# Patient Record
Sex: MALE
[De-identification: names, ages, dates, MRNs, and addresses within clinical notes are randomized per-mention and may not be internally consistent; named-entity substitution may affect disease eponyms.]

---

## 2017-04-20 PROBLEM — Z00.00 ENCOUNTER FOR PREVENTIVE HEALTH EXAMINATION: Status: ACTIVE | Noted: 2017-04-20

## 2017-04-28 ENCOUNTER — APPOINTMENT (OUTPATIENT)
Dept: GASTROENTEROLOGY | Facility: CLINIC | Age: 62
End: 2017-04-28

## 2020-04-13 ENCOUNTER — EMERGENCY (EMERGENCY)
Facility: HOSPITAL | Age: 65
LOS: 1 days | Discharge: HOME | End: 2020-04-13
Attending: EMERGENCY MEDICINE
Payer: COMMERCIAL

## 2020-04-13 VITALS
WEIGHT: 149.91 LBS | HEART RATE: 102 BPM | SYSTOLIC BLOOD PRESSURE: 127 MMHG | TEMPERATURE: 99 F | RESPIRATION RATE: 16 BRPM | HEIGHT: 65 IN | DIASTOLIC BLOOD PRESSURE: 83 MMHG | OXYGEN SATURATION: 97 %

## 2020-04-13 PROCEDURE — 93010 ELECTROCARDIOGRAM REPORT: CPT

## 2020-04-13 PROCEDURE — 99284 EMERGENCY DEPT VISIT MOD MDM: CPT

## 2020-04-13 NOTE — ED ADULT TRIAGE NOTE - CHIEF COMPLAINT QUOTE
+covid increased diff breathing ,resolved with supplemental oxygen.  As per EMS 95% on room air, 97% on 4L NC.

## 2020-04-13 NOTE — ED PROVIDER NOTE - PHYSICAL EXAMINATION
General: In NAD, non-toxic/well-appearing; well nourished/developed.  Skin: No rashes or lesions. Warm, dry, color normal for race.   Cardio: No lifts, heaves, visible pulsations. No thrills. Rate and rhythm regular, S1 & S2 clear. No audible murmur, gallop, or rub.  Resp: Speaking in full sentences. No visible nasal flaring, retractions, or deformity. Normal AP to lateral diameter, symmetrical excursion b/l. Breath sounds vesicular, symmetrical and without rales, rhonchi or wheezing b/l.  Abd: Non-distended. Soft, non-tender, no masses palpated. No rebound, guarding.   Neuro: A&Ox3. CN II-XII grossly intact.

## 2020-04-13 NOTE — ED PROVIDER NOTE - ATTENDING CONTRIBUTION TO CARE
64yoM; with no signif pmh; now p/w cough ans sob x5 days.  patient reports being tested COVID+ and c/o increasing sob.  found to be 95% on room air and 98% with ambulation.  General:     NAD, well-nourished, well-appearing  Head:     NC/AT, EOMI, oral mucosa moist  Neck:     trachea midline  Lungs:     CTA b/l, no w/r/r  CVS:     S1S2, RRR, no m/g/r  Abd:     +BS, s/nt/nd, no organomegaly  Ext:    2+ radial and pedal pulses, no c/c/e  Neuro: AAOx3, no sensory/motor deficits  A/P:  64yoM p/w pain 2/2  covid  -patient with stable vitals, normal exam, will dc with precuations to return if any worsening.

## 2020-04-13 NOTE — ED PROVIDER NOTE - OBJECTIVE STATEMENT
Radha. 65 yo male no PMHx presents to [facility] ED c/o shortness of breath x5 days, worsening this evening. "I feel like my lungs are inflamed." 5 days ago tested +COVID 19 at [facility]. Last medicated with acetaminophen 1 day ago. As per triage note, 95% upon initial EMS encounter. No further complaints at this time.  Radha. 65 yo male no PMHx presents to [facility] ED c/o shortness of breath x5 days, worsening this evening. "I feel like my lungs are inflamed." 5 days ago tested +COVID 19 at [facility]; no imaging or blood work. Last medicated with acetaminophen 1 day ago. As per triage note, 95% upon initial EMS encounter. Did not present to [facility] this evening because was brought to [facility] by EMS. No further complaints at this time.

## 2020-04-13 NOTE — ED PROVIDER NOTE - PATIENT PORTAL LINK FT
You can access the FollowMyHealth Patient Portal offered by [facility] by registering at the following website: [email or web address]. By joining [employer]’s FollowMyHealth portal, you will also be able to view your health information using other applications (apps) compatible with our system.

## 2020-04-13 NOTE — ED PROVIDER NOTE - NSFOLLOWUPINSTRUCTIONS_ED_ALL_ED_FT
RENETTA TODAS LAS INSTRUCCIONES ADJUNTAS PARA CORONAVIRUS INMEDIATAMENTE   - Practicar el distanciamiento social y evitar el contacto con los demás. Evite compartir artículos personales para el hogar.   - Practicar la higiene adecuada de las chandler. Desinfectar superficies con frecuencia. Cúbrase la tos y estornude.   - Manténgase hidratado.      BUSCA ATENCIÓN MÉDICA INMEDIATA SI TIENES CUALQUIERA DE LOS SIGUIENTES SÍNTOMAS  **Busca atención médica inmediata si desarrollas nuevos/empeoramiento, signos/síntomas o preocupaciones, incluyendo, dominik no limitado a dificultad para respirar, dificultad para respirar, dolor en el pecho, confusión, debilidad severa.**    Si ingrid que está experimentando low emergencia médica llame al 9-1-1.    READ ALL ATTACHED INSTRUCTIONS FOR CORONAVIRUS IMMEDIATELY   - Do not go to work/school/public areas/public transit.  - Practice social distancing and avoid contact with others. Avoid sharing personal household items.   - Practice proper hand hygiene. Disinfect surfaces frequently. Cover your coughs and sneezes.   - Stay hydrated.    - Acetaminophen 650mg and ibuprofen 200mg every 4 hours for fever.  - Practice intermittent prone postioning.     SEEK IMMEDIATE MEDICAL CARE IF YOU HAVE ANY OF THE FOLLOWING SYMPTOMS  **Seek immediate medicate attention if you develop new/worsening, signs/symptoms or concerns including, but not limited to shortness of breath, difficulty breathing, chest pain, confusion, severe weakness.**    If you believe you are experiencing a medical emergency call 9-1-1.

## 2020-04-13 NOTE — ED PROVIDER NOTE - CLINICAL SUMMARY MEDICAL DECISION MAKING FREE TEXT BOX
63 yo male no PMHx presents to [facility] ED c/o shortness of breath x5 days, worsening this evening. +COVID19. Patient is non-toxic/well-appearing. Patient afebrile, non-hypoxic. Upon ambulation trial, patient 98% [place] . Management decisions made amidst the COVID-19 public health emergency. Patient educated on symptomatic relief as well as well to return to the ED. Patient expresses verbal understanding and agreement of plan.

## 2020-04-14 PROCEDURE — 99283 EMERGENCY DEPT VISIT LOW MDM: CPT

## 2020-04-14 PROCEDURE — 93005 ELECTROCARDIOGRAM TRACING: CPT

## 2020-04-14 PROCEDURE — T1013: CPT

## 2020-04-14 RX ORDER — ALBUTEROL 90 UG/1
2 AEROSOL, METERED ORAL
Qty: 1 | Refills: 0
Start: 2020-04-14

## 2020-04-14 RX ORDER — ACETAMINOPHEN 500 MG
2 TABLET ORAL
Qty: 60 | Refills: 0
Start: 2020-04-14 | End: 2020-04-18

## 2021-04-28 ENCOUNTER — APPOINTMENT (OUTPATIENT)
Age: 66
End: 2021-04-28
Payer: MEDICAID

## 2021-04-28 PROBLEM — Z78.9 OTHER SPECIFIED HEALTH STATUS: Chronic | Status: ACTIVE | Noted: 2020-04-13

## 2021-04-28 PROCEDURE — 0001A: CPT

## 2021-05-26 ENCOUNTER — APPOINTMENT (OUTPATIENT)
Age: 66
End: 2021-05-26
Payer: MEDICAID

## 2021-05-26 PROCEDURE — 0012A: CPT
